# Patient Record
Sex: FEMALE | Race: WHITE | NOT HISPANIC OR LATINO | URBAN - METROPOLITAN AREA
[De-identification: names, ages, dates, MRNs, and addresses within clinical notes are randomized per-mention and may not be internally consistent; named-entity substitution may affect disease eponyms.]

---

## 2018-11-03 ENCOUNTER — EMERGENCY (EMERGENCY)
Facility: HOSPITAL | Age: 48
LOS: 0 days | Discharge: HOME | End: 2018-11-03
Admitting: PHYSICIAN ASSISTANT

## 2018-11-03 DIAGNOSIS — M79.89 OTHER SPECIFIED SOFT TISSUE DISORDERS: ICD-10-CM

## 2018-11-03 DIAGNOSIS — M25.531 PAIN IN RIGHT WRIST: ICD-10-CM

## 2018-11-03 DIAGNOSIS — F17.200 NICOTINE DEPENDENCE, UNSPECIFIED, UNCOMPLICATED: ICD-10-CM

## 2018-11-03 NOTE — ED PROCEDURE NOTE - NS ED PERI VASCULAR NEG
capillary refill time < 2 seconds/fingers/toes warm to touch/no cyanosis of extremity/no swelling/no paresthesia

## 2018-11-03 NOTE — ED PROVIDER NOTE - PHYSICAL EXAMINATION
CONST: Well appearing in NAD  MS: Right wrist with localized swelling and tenderness to dorsal wrist. No snuff box tenderness. Pain on ROM. No redness. No streaking. NV intact distally  SKIN: Warm, dry, no acute rashes. Good turgor  NEURO: A&Ox3, No focal deficits. Strength 5/5 with no sensory deficits. Steady gait

## 2018-11-03 NOTE — ED PROVIDER NOTE - OBJECTIVE STATEMENT
Pt with no PMH woke this am with atraumatic right wrist pain and swelling. Ot is right hand dominant but cant recall a precipitating factor. Pain is on ROM. Denies fever or chills. Denies other joint swelling. Denies rash or insect bite.

## 2018-11-03 NOTE — ED PROVIDER NOTE - NSFOLLOWUPCLINICS_GEN_ALL_ED_FT
[facility], [facility]  Hand  [address], [address]  [place] [address]  Phone: [phone]  Fax:   Follow Up Time:

## 2020-10-15 ENCOUNTER — EMERGENCY (EMERGENCY)
Facility: HOSPITAL | Age: 50
LOS: 0 days | Discharge: HOME | End: 2020-10-15
Attending: EMERGENCY MEDICINE | Admitting: EMERGENCY MEDICINE
Payer: MEDICAID

## 2020-10-15 VITALS
SYSTOLIC BLOOD PRESSURE: 185 MMHG | OXYGEN SATURATION: 100 % | HEART RATE: 78 BPM | WEIGHT: 145.06 LBS | DIASTOLIC BLOOD PRESSURE: 100 MMHG | RESPIRATION RATE: 16 BRPM | TEMPERATURE: 98 F

## 2020-10-15 DIAGNOSIS — M79.672 PAIN IN LEFT FOOT: ICD-10-CM

## 2020-10-15 DIAGNOSIS — L53.9 ERYTHEMATOUS CONDITION, UNSPECIFIED: ICD-10-CM

## 2020-10-15 DIAGNOSIS — F17.200 NICOTINE DEPENDENCE, UNSPECIFIED, UNCOMPLICATED: ICD-10-CM

## 2020-10-15 DIAGNOSIS — Z79.899 OTHER LONG TERM (CURRENT) DRUG THERAPY: ICD-10-CM

## 2020-10-15 DIAGNOSIS — M25.572 PAIN IN LEFT ANKLE AND JOINTS OF LEFT FOOT: ICD-10-CM

## 2020-10-15 DIAGNOSIS — M79.89 OTHER SPECIFIED SOFT TISSUE DISORDERS: ICD-10-CM

## 2020-10-15 PROCEDURE — 99283 EMERGENCY DEPT VISIT LOW MDM: CPT

## 2020-10-15 RX ORDER — INDOMETHACIN 50 MG
1 CAPSULE ORAL
Qty: 21 | Refills: 0
Start: 2020-10-15 | End: 2020-10-21

## 2020-10-15 NOTE — ED ADULT TRIAGE NOTE - CHIEF COMPLAINT QUOTE
pt states she woke up yesterday and her left foot was swollen. denies injury  denies chest pain. states she has HTN but does not take anything for it

## 2020-10-15 NOTE — ED PROVIDER NOTE - PATIENT PORTAL LINK FT
You can access the FollowMyHealth Patient Portal offered by [facility] by registering at the following website: [email or web address]. By joining [employer]’s FollowMyHealth portal, you will also be able to view your health information using other applications (apps) compatible with our system.

## 2020-10-15 NOTE — ED PROVIDER NOTE - NSFOLLOWUPINSTRUCTIONS_ED_ALL_ED_FT
Take medications as directed. Keep affected area elevated, iced and limit wt bearing as much as possible over the next few days. Follow up with your primary care doctor and podiatry for further eval.  Return for worsening symptoms. Take medications as directed. Keep affected area elevated, iced and limit wt bearing as much as possible over the next few days. Follow up with your primary care doctor and podiatry for further eval.  Return for worsening symptoms.    Your blood pressure was elevated at today's visit.  Please follow up with your primary are doctor for re-evaluation.

## 2020-10-15 NOTE — ED PROVIDER NOTE - MUSCULOSKELETAL, MLM
(+) tenderness and swelling Left 1st MTP, FROM with pain, no motor or sensory deficit, cap refill < 2sec

## 2020-10-15 NOTE — ED PROVIDER NOTE - ATTENDING CONTRIBUTION TO CARE
pt with pain to left first MTP for 2 days. no hx of gout. no trauma. no fever. pain is throbbing. non radiating.   pt in nad, legs nml. L  over MTP with swelling, mild erythema. no crep. FROM all joints. nml pulses, cap refill. nml sensation.  otherwise LE exam is nml.  will tx empirically for gout with nsaids outpt f/u.

## 2020-10-15 NOTE — ED PROVIDER NOTE - OBJECTIVE STATEMENT
50 y.o. female without any PMH presented to the ER c/o one day h/o atraumatic pain to Left great toe.  Denies fever, chills, trauma.  No other complaints.

## 2021-01-06 ENCOUNTER — EMERGENCY (EMERGENCY)
Facility: HOSPITAL | Age: 51
LOS: 0 days | Discharge: HOME | End: 2021-01-06
Attending: EMERGENCY MEDICINE | Admitting: EMERGENCY MEDICINE
Payer: MEDICAID

## 2021-01-06 VITALS
SYSTOLIC BLOOD PRESSURE: 179 MMHG | HEART RATE: 94 BPM | OXYGEN SATURATION: 98 % | TEMPERATURE: 98 F | RESPIRATION RATE: 18 BRPM | DIASTOLIC BLOOD PRESSURE: 105 MMHG | WEIGHT: 149.91 LBS
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
## 2021-01-06 DIAGNOSIS — S49.92XA UNSPECIFIED INJURY OF LEFT SHOULDER AND UPPER ARM, INITIAL ENCOUNTER: ICD-10-CM
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
## 2021-01-06 PROCEDURE — 73080 X-RAY EXAM OF ELBOW: CPT | Mod: 26,LT

## 2021-01-06 PROCEDURE — 73030 X-RAY EXAM OF SHOULDER: CPT | Mod: 26,LT

## 2021-01-06 PROCEDURE — 99284 EMERGENCY DEPT VISIT MOD MDM: CPT

## 2021-01-06 RX ORDER — KETOROLAC TROMETHAMINE 30 MG/ML
30 SYRINGE (ML) INJECTION ONCE
Refills: 0 | Status: DISCONTINUED | OUTPATIENT
Start: 2021-01-06 | End: 2021-01-06

## 2021-01-06 RX ADMIN — Medication 30 MILLIGRAM(S): at 14:41

## 2021-01-06 NOTE — ED PROVIDER NOTE - CLINICAL SUMMARY MEDICAL DECISION MAKING FREE TEXT BOX
51 yo woman with left shoulder injury.  normal XRAY.  Sling and RICE.  NSAIDs as needed.  Outpatient follow up.

## 2021-01-06 NOTE — ED PROVIDER NOTE - CARE PROVIDER_API CALL
Misbah Lora  ORTHOPAEDIC SURGERY  [address] [place] [address]  Phone: [phone]  Fax: [phone]  Follow Up Time:

## 2021-01-06 NOTE — ED PROVIDER NOTE - PHYSICAL EXAMINATION
CONST: Well appearing in NAD  EYES: PERRL, EOMI, Sclera and conjunctiva clear.  NECK: Non-tender  CARD: Normal S1 S2; Normal rate and rhythm  RESP: Equal BS B/L, No wheezes, rhonchi or rales. No distress  MS:LUE held in adduction and flexion as position of comfort. Tenderness to humeral head and along biceps. No deformity. Palpable humeral head. Mild tenderness L lateral epicondyle.  SKIN: Warm, dry, no acute rashes. Good turgor. No ecchymosis

## 2021-01-06 NOTE — ED PROVIDER NOTE - OBJECTIVE STATEMENT
51yo female with PMHx HTN presents c/o L shoulder pain s/p falling while ice skating onto L shoulder, associated with worsening pain upon ROM, aggravated by movements, relieved by nothing x 1 week. Pt reports diminished abduction and extension due to pain. Pt denies numbness/tingling/extremity weakness. Pt denies head injury. Denies any other extremity injuries to LUE.

## 2021-01-06 NOTE — ED PROVIDER NOTE - ATTENDING CONTRIBUTION TO CARE
I personally evaluated the patient. I reviewed the Resident’s or Physician Assistant’s note (as assigned above), and agree with the findings and plan except as documented in my note.  Slip and fall with left shoulder pain.  no deformity.  Likely contusion.  XRAY ok.  Sling and RICE.  Outpatient ortho follow up.

## 2021-01-06 NOTE — ED PROVIDER NOTE - PATIENT PORTAL LINK FT
You can access the FollowMyHealth Patient Portal offered by [facility] by registering at the following website: [email or web address]. By joining [employer]’s FollowMyHealth portal, you will also be able to view your health information using other applications (apps) compatible with our system.

## 2021-01-06 NOTE — ED PROVIDER NOTE - NSFOLLOWUPINSTRUCTIONS_ED_ALL_ED_FT
Shoulder injury    XRAY was ok.  But there still could be underlying injury or structural damage.  Sling placed.  Please follow up with ortho in the next few weeks.  2-3 times per day, take yourself out of the sling and try to slowly move that left shoulder in circles.

## 2021-02-26 ENCOUNTER — EMERGENCY (EMERGENCY)
Facility: HOSPITAL | Age: 51
LOS: 0 days | Discharge: HOME | End: 2021-02-26
Attending: STUDENT IN AN ORGANIZED HEALTH CARE EDUCATION/TRAINING PROGRAM | Admitting: STUDENT IN AN ORGANIZED HEALTH CARE EDUCATION/TRAINING PROGRAM
Payer: MEDICAID

## 2021-02-26 VITALS
SYSTOLIC BLOOD PRESSURE: 181 MMHG | HEART RATE: 80 BPM | HEIGHT: 66 IN | TEMPERATURE: 98 F | OXYGEN SATURATION: 99 % | DIASTOLIC BLOOD PRESSURE: 93 MMHG | WEIGHT: 149.91 LBS | RESPIRATION RATE: 18 BRPM

## 2021-02-26 DIAGNOSIS — S22.31XA FRACTURE OF ONE RIB, RIGHT SIDE, INITIAL ENCOUNTER FOR CLOSED FRACTURE: ICD-10-CM
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
## 2021-02-26 DIAGNOSIS — F17.200 NICOTINE DEPENDENCE, UNSPECIFIED, UNCOMPLICATED: ICD-10-CM
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
## 2021-02-26 PROCEDURE — 71101 X-RAY EXAM UNILAT RIBS/CHEST: CPT | Mod: 26,RT

## 2021-02-26 PROCEDURE — 99284 EMERGENCY DEPT VISIT MOD MDM: CPT

## 2021-02-26 PROCEDURE — 71046 X-RAY EXAM CHEST 2 VIEWS: CPT | Mod: 26

## 2021-02-26 RX ORDER — KETOROLAC TROMETHAMINE 30 MG/ML
30 SYRINGE (ML) INJECTION ONCE
Refills: 0 | Status: DISCONTINUED | OUTPATIENT
Start: 2021-02-26 | End: 2021-02-26

## 2021-02-26 RX ORDER — KETOROLAC TROMETHAMINE 30 MG/ML
15 SYRINGE (ML) INJECTION ONCE
Refills: 0 | Status: DISCONTINUED | OUTPATIENT
Start: 2021-02-26 | End: 2021-02-26

## 2021-02-26 RX ADMIN — Medication 30 MILLIGRAM(S): at 15:00

## 2021-02-26 NOTE — ED PROVIDER NOTE - CLINICAL SUMMARY MEDICAL DECISION MAKING FREE TEXT BOX
49 y/o F nos sig pmh p/w R chest wall pain s/p mech fall going down stairs, falling back striking ribs on step (2/20). No LOC. Pt had some discomfort there, but "not bad". 3 days later after coughing, pt had acute sharp severe pain in the same area. No new trauma, fever, or abd pain.    Pt is NAD; no resp distress; RRR CTAB; + ttp R post/lat ribs. abd s/nt.    xray shows R broken rib. NO PTX or consolidation. Discussed this w/ pt, gave her incentive spirometer and rx for percocet.  dc home w/ ortho fup. Pt understands signs and symptoms for ED return.

## 2021-02-26 NOTE — ED PROVIDER NOTE - OBJECTIVE STATEMENT
49 y/o F, no significant PMHx, presents to the ED with complaints of right sided rib discomfort s/p mechanical fall on 2/20/2021. Patient tripped and fell outside, landing on her backside with resultant right sided rib/back discomfort. She denies any head injury/LOC and stood up on her own following fall. She has been taking Flexeril, Nabumetone, & Tylenol prn for pain relief. She denies dyspnea, chest pain, abdominal pain, nausea, vomiting, neck pain and additional injuries. She denies anticoagulation use.

## 2021-02-26 NOTE — ED PROVIDER NOTE - CARE PROVIDER_API CALL
Misbah Lora (MD)  Orthopaedic Surgery  [address] [place] [address]  Phone: [phone]  Fax: [phone]  Follow Up Time: Routine

## 2021-02-26 NOTE — ED PROVIDER NOTE - PATIENT PORTAL LINK FT
You can access the FollowMyHealth Patient Portal offered by [facility] by registering at the following website: [email or web address]. By joining [employer]’s FollowMyHealth portal, you will also be able to view your health information using other applications (apps) compatible with our system.

## 2021-02-26 NOTE — ED PROVIDER NOTE - MUSCULOSKELETAL, MLM
+ right sided posterior rib tenderness ; no mid-line tenderness ; spine appears normal ; no joint tenderness

## 2023-07-08 NOTE — ED PROVIDER NOTE - NS ED ROS FT
CONST: No fever, chills or bodyaches  CARD: No chest pain, palpitations  RESP: No SOB, cough  GI: No abdominal pain, N/V/D  MS: see HPI  SKIN: No rashes  NEURO: No headache, dizziness, paresthesias or LOC - - -

## 2023-11-02 ENCOUNTER — APPOINTMENT (OUTPATIENT)
Dept: CARDIOLOGY | Facility: CLINIC | Age: 53
End: 2023-11-02
Payer: COMMERCIAL

## 2023-11-02 VITALS
HEIGHT: 66 IN | SYSTOLIC BLOOD PRESSURE: 170 MMHG | BODY MASS INDEX: 26.52 KG/M2 | DIASTOLIC BLOOD PRESSURE: 108 MMHG | WEIGHT: 165 LBS

## 2023-11-02 VITALS — HEART RATE: 85 BPM

## 2023-11-02 DIAGNOSIS — Z13.6 ENCOUNTER FOR SCREENING FOR CARDIOVASCULAR DISORDERS: ICD-10-CM

## 2023-11-02 PROCEDURE — 99204 OFFICE O/P NEW MOD 45 MIN: CPT

## 2023-11-02 PROCEDURE — 93000 ELECTROCARDIOGRAM COMPLETE: CPT

## 2023-11-02 RX ORDER — LOSARTAN POTASSIUM 25 MG/1
25 TABLET, FILM COATED ORAL
Refills: 0 | Status: ACTIVE | COMMUNITY

## 2023-11-16 ENCOUNTER — APPOINTMENT (OUTPATIENT)
Dept: CARDIOLOGY | Facility: CLINIC | Age: 53
End: 2023-11-16
Payer: COMMERCIAL

## 2023-11-16 PROCEDURE — 93351 STRESS TTE COMPLETE: CPT

## 2023-11-16 PROCEDURE — 93325 DOPPLER ECHO COLOR FLOW MAPG: CPT

## 2023-11-16 PROCEDURE — 93320 DOPPLER ECHO COMPLETE: CPT

## 2023-12-05 ENCOUNTER — APPOINTMENT (OUTPATIENT)
Dept: CARDIOLOGY | Facility: CLINIC | Age: 53
End: 2023-12-05

## 2024-02-15 ENCOUNTER — APPOINTMENT (OUTPATIENT)
Dept: CARDIOLOGY | Facility: CLINIC | Age: 54
End: 2024-02-15

## 2024-03-12 ENCOUNTER — APPOINTMENT (OUTPATIENT)
Dept: PODIATRY | Facility: CLINIC | Age: 54
End: 2024-03-12

## 2024-03-20 ENCOUNTER — APPOINTMENT (OUTPATIENT)
Dept: CARDIOLOGY | Facility: CLINIC | Age: 54
End: 2024-03-20
Payer: COMMERCIAL

## 2024-03-20 VITALS
WEIGHT: 174 LBS | SYSTOLIC BLOOD PRESSURE: 130 MMHG | HEART RATE: 95 BPM | BODY MASS INDEX: 27.97 KG/M2 | HEIGHT: 66 IN | DIASTOLIC BLOOD PRESSURE: 80 MMHG

## 2024-03-20 DIAGNOSIS — R07.89 OTHER CHEST PAIN: ICD-10-CM

## 2024-03-20 DIAGNOSIS — I10 ESSENTIAL (PRIMARY) HYPERTENSION: ICD-10-CM

## 2024-03-20 PROCEDURE — 99214 OFFICE O/P EST MOD 30 MIN: CPT

## 2024-03-20 PROCEDURE — 93000 ELECTROCARDIOGRAM COMPLETE: CPT

## 2024-03-20 NOTE — HISTORY OF PRESENT ILLNESS
[FreeTextEntry1] : The patient has a negative cardiovascular history in the past. She denies a history of atherosclerotic heart disease, hypertensive heart disease or valvular heart disease. She complains of chest pain, atypical for coronary artery disease. The pain is not exertional in nature, recovering at rest and pinpoint. It is associated with paresthesias in the left arm probably secondary to cervical right radiculopathy. The patient has no history of exertional chest pain or exertional shortness of breath. Her risk factors for ischemic heart disease include cigarette smoking, family history and mild condition controlled hypertension She denies a history of CHF, pedal edema or signs of cardiomyopathic processes. There is no history of TIA, CVA or PAD  EKG is normal sinus rhythm Stress echo was normal

## 2024-03-20 NOTE — PHYSICAL EXAM
[Well Developed] : well developed [Well Nourished] : well nourished [No Acute Distress] : no acute distress [Normal Conjunctiva] : normal conjunctiva [Normal Venous Pressure] : normal venous pressure [No Carotid Bruit] : no carotid bruit [Normal S1, S2] : normal S1, S2 [No Rub] : no rub [No Murmur] : no murmur [No Gallop] : no gallop [Clear Lung Fields] : clear lung fields [No Respiratory Distress] : no respiratory distress  [Good Air Entry] : good air entry [Soft] : abdomen soft [Non Tender] : non-tender [No Masses/organomegaly] : no masses/organomegaly [Normal Gait] : normal gait [Normal Bowel Sounds] : normal bowel sounds [No Edema] : no edema [No Cyanosis] : no cyanosis [No Clubbing] : no clubbing [No Varicosities] : no varicosities [No Rash] : no rash [No Skin Lesions] : no skin lesions [Moves all extremities] : moves all extremities [No Focal Deficits] : no focal deficits [Normal Speech] : normal speech [Alert and Oriented] : alert and oriented [Normal memory] : normal memory